# Patient Record
Sex: FEMALE | URBAN - METROPOLITAN AREA
[De-identification: names, ages, dates, MRNs, and addresses within clinical notes are randomized per-mention and may not be internally consistent; named-entity substitution may affect disease eponyms.]

---

## 2021-04-12 ENCOUNTER — OFFICE VISIT (OUTPATIENT)
Dept: URBAN - METROPOLITAN AREA CLINIC 128 | Facility: CLINIC | Age: 86
End: 2021-04-12
Payer: MEDICARE

## 2021-04-12 DIAGNOSIS — R13.10 ESOPHAGEAL DYSPHAGIA: ICD-10-CM

## 2021-04-12 DIAGNOSIS — R10.84 ABDOMINAL PAIN, DIFFUSE: ICD-10-CM

## 2021-04-12 DIAGNOSIS — K59.00 CONSTIPATION, UNSPECIFIED CONSTIPATION TYPE: ICD-10-CM

## 2021-04-12 PROCEDURE — 99203 OFFICE O/P NEW LOW 30 MIN: CPT | Performed by: INTERNAL MEDICINE

## 2021-04-12 RX ORDER — WARFARIN SODIUM 5 MG/1
1 TABLET TABLET ORAL ONCE A DAY
Status: ACTIVE | COMMUNITY

## 2021-04-12 RX ORDER — MAGNESIUM OXIDE 500 MG
AS DIRECTED TABLET ORAL
Status: ACTIVE | COMMUNITY

## 2021-04-12 RX ORDER — PRAVASTATIN SODIUM 20 MG/1
1 TABLET TABLET ORAL ONCE A DAY
Status: ACTIVE | COMMUNITY

## 2021-04-12 RX ORDER — AMITRIPTYLINE HYDROCHLORIDE 10 MG/1
1 TABLET AT BEDTIME TABLET, FILM COATED ORAL ONCE A DAY
Qty: 30 | OUTPATIENT
Start: 2021-04-12

## 2021-04-12 RX ORDER — METHIMAZOLE 5 MG/1
1 TABLET TABLET ORAL ONCE A DAY
Status: ACTIVE | COMMUNITY

## 2021-04-12 RX ORDER — SPIRONOLACTONE 25 MG/1
1 TABLET TABLET, FILM COATED ORAL
Status: ACTIVE | COMMUNITY

## 2021-04-12 RX ORDER — OMEPRAZOLE 20 MG/1
1 CAPSULE 30 MINUTES BEFORE MORNING MEAL CAPSULE, DELAYED RELEASE ORAL ONCE A DAY
Qty: 90 TABLETS | Refills: 3 | OUTPATIENT
Start: 2021-04-12

## 2021-04-12 RX ORDER — LOSARTAN POTASSIUM 50 MG/1
1 TABLET TABLET ORAL ONCE A DAY
Status: ACTIVE | COMMUNITY

## 2021-04-12 RX ORDER — TORSEMIDE 20 MG/1
AS DIRECTED TABLET ORAL
Status: ACTIVE | COMMUNITY

## 2021-04-12 RX ORDER — INSULIN LISPRO 100 [IU]/ML
AS DIRECTED INJECTION, SOLUTION INTRAVENOUS; SUBCUTANEOUS
Status: ACTIVE | COMMUNITY

## 2021-04-12 RX ORDER — CARVEDILOL 12.5 MG/1
1 TABLET WITH FOOD TABLET, FILM COATED ORAL TWICE A DAY
Status: ACTIVE | COMMUNITY

## 2021-04-12 RX ORDER — DICYCLOMINE HYDROCHLORIDE 10 MG/1
1 TABLET CAPSULE ORAL THREE TIMES A DAY
Status: ACTIVE | COMMUNITY

## 2021-04-12 RX ORDER — INSULIN GLARGINE 300 U/ML
AS DIRECTED INJECTION, SOLUTION SUBCUTANEOUS
Status: ACTIVE | COMMUNITY

## 2021-04-12 NOTE — PHYSICAL EXAM GASTROINTESTINAL
Abdomen full/overweight, soft, nontender, nondistended, no masses palpable , normal bowel sounds   Liver and Spleen , no hepatomegaly present, liver edge nontender , spleen not palpable   Rectal: deferred

## 2021-04-12 NOTE — PHYSICAL EXAM CONSTITUTIONAL:
well developed, well nourished , in no acute distress, normal communication ability, appears younger than stated age

## 2021-04-12 NOTE — HPI-TODAY'S VISIT:
84yo woman accompanied by her daughter with complaint of progressive difficulty with post defecatory abdominal pain for about 3 hours in the morning.  This has been an issue for about 8-10 years but has been daily for at loeast several months.  No bleeding.  Last colonoscopy about 10 years ago -- failed recent attempt at bowel prep.  Usually 3-4 stools over several hours in the morning.  Relates onset to taking diuretics in the morning, eating, and then bowel movements.  Pain is lower abdomen then diffuse.  No prior abdominal surgeries.  There is hx of CHF and she takes multiple medications.  Diabetic for 20 years.  Some mild neuropathy offered.   She consumes alot of lactose.  Has been on fiber and stool softners in the past but not taking lately despite offering that they helped with BMs in the past.  There is consumption of large amount of lactose daily.  There is also a hx of progressive difficulty with dysphagia for solids -- recently occurring several times a week.  No typical heartburn or reflux.  Patient is taking coumadin and has a pacemaker.  Ambulatory capacity is limited and visits today in a wheelchair.

## 2021-04-22 ENCOUNTER — LAB OUTSIDE AN ENCOUNTER (OUTPATIENT)
Dept: URBAN - METROPOLITAN AREA CLINIC 128 | Facility: CLINIC | Age: 86
End: 2021-04-22

## 2021-05-20 ENCOUNTER — ERX REFILL RESPONSE (OUTPATIENT)
Dept: URBAN - METROPOLITAN AREA CLINIC 128 | Facility: CLINIC | Age: 86
End: 2021-05-20

## 2021-05-20 RX ORDER — AMITRIPTYLINE HYDROCHLORIDE 10 MG/1
1 TABLET AT BEDTIME TABLET, FILM COATED ORAL ONCE A DAY
Qty: 30 | Refills: 5

## 2021-06-10 ENCOUNTER — DASHBOARD ENCOUNTERS (OUTPATIENT)
Age: 86
End: 2021-06-10

## 2021-06-10 ENCOUNTER — OFFICE VISIT (OUTPATIENT)
Dept: URBAN - METROPOLITAN AREA CLINIC 128 | Facility: CLINIC | Age: 86
End: 2021-06-10
Payer: MEDICARE

## 2021-06-10 DIAGNOSIS — K59.00 CONSTIPATION, UNSPECIFIED CONSTIPATION TYPE: ICD-10-CM

## 2021-06-10 DIAGNOSIS — K21.9 GASTROESOPHAGEAL REFLUX DISEASE, UNSPECIFIED WHETHER ESOPHAGITIS PRESENT: ICD-10-CM

## 2021-06-10 DIAGNOSIS — R13.10 ESOPHAGEAL DYSPHAGIA: ICD-10-CM

## 2021-06-10 DIAGNOSIS — R10.84 ABDOMINAL PAIN, DIFFUSE: ICD-10-CM

## 2021-06-10 PROBLEM — 40890009: Status: ACTIVE | Noted: 2021-04-12

## 2021-06-10 PROBLEM — 14760008: Status: ACTIVE | Noted: 2021-04-12

## 2021-06-10 PROCEDURE — 99214 OFFICE O/P EST MOD 30 MIN: CPT | Performed by: INTERNAL MEDICINE

## 2021-06-10 RX ORDER — DICYCLOMINE HYDROCHLORIDE 10 MG/1
1 TABLET CAPSULE ORAL THREE TIMES A DAY
Status: ACTIVE | COMMUNITY

## 2021-06-10 RX ORDER — PANTOPRAZOLE SODIUM 40 MG/1
1 TABLET TABLET, DELAYED RELEASE ORAL ONCE A DAY
Qty: 90 TABLET | Refills: 3 | OUTPATIENT
Start: 2021-06-10

## 2021-06-10 RX ORDER — INSULIN LISPRO 100 [IU]/ML
AS DIRECTED INJECTION, SOLUTION INTRAVENOUS; SUBCUTANEOUS
Status: ACTIVE | COMMUNITY

## 2021-06-10 RX ORDER — MAGNESIUM OXIDE 500 MG
AS DIRECTED TABLET ORAL
Status: ACTIVE | COMMUNITY

## 2021-06-10 RX ORDER — CARVEDILOL 12.5 MG/1
1 TABLET WITH FOOD TABLET, FILM COATED ORAL TWICE A DAY
Status: ACTIVE | COMMUNITY

## 2021-06-10 RX ORDER — PRAVASTATIN SODIUM 20 MG/1
1 TABLET TABLET ORAL ONCE A DAY
Status: ACTIVE | COMMUNITY

## 2021-06-10 RX ORDER — INSULIN GLARGINE 300 U/ML
AS DIRECTED INJECTION, SOLUTION SUBCUTANEOUS
Status: ACTIVE | COMMUNITY

## 2021-06-10 RX ORDER — OMEPRAZOLE 20 MG/1
1 CAPSULE 30 MINUTES BEFORE MORNING MEAL CAPSULE, DELAYED RELEASE ORAL ONCE A DAY
Qty: 90 TABLETS | Refills: 3 | Status: ACTIVE | COMMUNITY
Start: 2021-04-12

## 2021-06-10 RX ORDER — LINACLOTIDE 145 UG/1
ONCE A DAY CAPSULE, GELATIN COATED ORAL ONCE A DAY
Qty: 90 CAPSULE | Refills: 3 | OUTPATIENT
Start: 2021-06-10 | End: 2022-06-05

## 2021-06-10 RX ORDER — TORSEMIDE 20 MG/1
AS DIRECTED TABLET ORAL
Status: ACTIVE | COMMUNITY

## 2021-06-10 RX ORDER — METHIMAZOLE 5 MG/1
1 TABLET TABLET ORAL ONCE A DAY
Status: ACTIVE | COMMUNITY

## 2021-06-10 RX ORDER — FAMOTIDINE 40 MG/1
1 TABLET TABLET, FILM COATED ORAL
Qty: 90 | Refills: 3 | OUTPATIENT
Start: 2021-06-10

## 2021-06-10 RX ORDER — AMITRIPTYLINE HYDROCHLORIDE 10 MG/1
1 TABLET AT BEDTIME TABLET, FILM COATED ORAL ONCE A DAY
Qty: 30 | Refills: 5 | Status: ACTIVE | COMMUNITY

## 2021-06-10 RX ORDER — LOSARTAN POTASSIUM 50 MG/1
1 TABLET TABLET ORAL ONCE A DAY
Status: ACTIVE | COMMUNITY

## 2021-06-10 RX ORDER — WARFARIN SODIUM 5 MG/1
1 TABLET TABLET ORAL ONCE A DAY
Status: ACTIVE | COMMUNITY

## 2021-06-10 RX ORDER — SPIRONOLACTONE 25 MG/1
1 TABLET TABLET, FILM COATED ORAL
Status: ACTIVE | COMMUNITY

## 2021-06-10 NOTE — HPI-TODAY'S VISIT:
Mrs. Bynum returns offering that her prior complaints are largely unchanged.  She now offers increased burping and a bad taste in the mouth at times. BMs are daily -- usually 2-3 in the morning followed by cramping, only with alot of straining.  BMs are sensed as incomplete.  No bleeding.  There is bloating.  There is nausea most of the day. Bentyl helps post BM cramping.  20+ years of diabetes.  Was unable to tolerate bowel prep for colonoscopy earlier and does not want to try again.  Weight has not decreased appreciably.  She is largely inactive.

## 2021-06-10 NOTE — PHYSICAL EXAM GASTROINTESTINAL
Abdomen soft, diffusely tender to palpation, mildly distended, no masses palpable , normal bowel sounds   Liver and Spleen , no hepatomegaly present, liver edge nontender , spleen not palpable   Rectal: deferred

## 2021-06-10 NOTE — PHYSICAL EXAM CONSTITUTIONAL:
well developed, overweight, well nourished , in no acute distress, normal communication ability, somewhat hard of hearing

## 2021-06-28 ENCOUNTER — LAB OUTSIDE AN ENCOUNTER (OUTPATIENT)
Dept: URBAN - METROPOLITAN AREA CLINIC 128 | Facility: CLINIC | Age: 86
End: 2021-06-28

## 2021-06-28 LAB
CREATININE POC: 1.5
PERFORMING LAB: (no result)

## 2021-07-14 ENCOUNTER — OUT OF OFFICE VISIT (OUTPATIENT)
Dept: URBAN - METROPOLITAN AREA MEDICAL CENTER 18 | Facility: MEDICAL CENTER | Age: 86
End: 2021-07-14
Payer: MEDICARE

## 2021-07-14 DIAGNOSIS — R10.84 ABDOMINAL CRAMPING, GENERALIZED: ICD-10-CM

## 2021-07-14 DIAGNOSIS — R11.0 CHRONIC NAUSEA: ICD-10-CM

## 2021-07-14 DIAGNOSIS — I50.9 ACUTE CONGESTIVE HEART FAILURE, UNSPECIFIED CONGESTIVE HEART FAILURE TYPE: ICD-10-CM

## 2021-07-14 DIAGNOSIS — K59.09 CHRONIC CONSTIPATION: ICD-10-CM

## 2021-07-14 PROCEDURE — 99222 1ST HOSP IP/OBS MODERATE 55: CPT | Performed by: PHYSICIAN ASSISTANT

## 2021-07-14 PROCEDURE — G8427 DOCREV CUR MEDS BY ELIG CLIN: HCPCS | Performed by: PHYSICIAN ASSISTANT

## 2021-07-15 ENCOUNTER — OUT OF OFFICE VISIT (OUTPATIENT)
Dept: URBAN - METROPOLITAN AREA MEDICAL CENTER 18 | Facility: MEDICAL CENTER | Age: 86
End: 2021-07-15
Payer: MEDICARE

## 2021-07-15 DIAGNOSIS — R11.0 CHRONIC NAUSEA: ICD-10-CM

## 2021-07-15 DIAGNOSIS — K59.09 CHRONIC CONSTIPATION: ICD-10-CM

## 2021-07-15 DIAGNOSIS — K22.4 CORKSCREW ESOPHAGUS: ICD-10-CM

## 2021-07-15 DIAGNOSIS — R10.84 ABDOMINAL CRAMPING, GENERALIZED: ICD-10-CM

## 2021-07-15 DIAGNOSIS — R13.19 CERVICAL DYSPHAGIA: ICD-10-CM

## 2021-07-15 DIAGNOSIS — R19.7 ACUTE DIARRHEA: ICD-10-CM

## 2021-07-15 PROCEDURE — 99232 SBSQ HOSP IP/OBS MODERATE 35: CPT | Performed by: PHYSICIAN ASSISTANT

## 2021-07-18 ENCOUNTER — OUT OF OFFICE VISIT (OUTPATIENT)
Dept: URBAN - METROPOLITAN AREA MEDICAL CENTER 18 | Facility: MEDICAL CENTER | Age: 86
End: 2021-07-18
Payer: MEDICARE

## 2021-07-18 DIAGNOSIS — R11.0 CHRONIC NAUSEA: ICD-10-CM

## 2021-07-18 DIAGNOSIS — R10.84 ABDOMINAL CRAMPING, GENERALIZED: ICD-10-CM

## 2021-07-18 DIAGNOSIS — K59.09 CHRONIC CONSTIPATION: ICD-10-CM

## 2021-07-18 DIAGNOSIS — R13.19 CERVICAL DYSPHAGIA: ICD-10-CM

## 2021-07-18 PROCEDURE — 99232 SBSQ HOSP IP/OBS MODERATE 35: CPT | Performed by: INTERNAL MEDICINE

## 2021-08-17 ENCOUNTER — OFFICE VISIT (OUTPATIENT)
Dept: URBAN - METROPOLITAN AREA CLINIC 80 | Facility: CLINIC | Age: 86
End: 2021-08-17

## 2021-08-23 NOTE — PHYSICAL EXAM HENT:
Head,  normocephalic,  atraumatic,  Face,  Face within normal limits, Nose/Nasopharynx,  External nose  normal appearance,  no nasal discharge,  Mouth and Throat,  Oral cavity appearance normal Lips,  Appearance normal
0.25